# Patient Record
Sex: MALE | Race: WHITE | Employment: FULL TIME | URBAN - METROPOLITAN AREA
[De-identification: names, ages, dates, MRNs, and addresses within clinical notes are randomized per-mention and may not be internally consistent; named-entity substitution may affect disease eponyms.]

---

## 2018-05-24 ENCOUNTER — OFFICE VISIT (OUTPATIENT)
Dept: PRIMARY CARE CLINIC | Age: 43
End: 2018-05-24
Payer: COMMERCIAL

## 2018-05-24 VITALS
OXYGEN SATURATION: 97 % | SYSTOLIC BLOOD PRESSURE: 136 MMHG | WEIGHT: 207 LBS | DIASTOLIC BLOOD PRESSURE: 84 MMHG | HEIGHT: 70 IN | TEMPERATURE: 98.5 F | HEART RATE: 59 BPM | BODY MASS INDEX: 29.63 KG/M2

## 2018-05-24 DIAGNOSIS — L50.8 URTICARIA, CHRONIC: Primary | ICD-10-CM

## 2018-05-24 PROCEDURE — 99213 OFFICE O/P EST LOW 20 MIN: CPT | Performed by: FAMILY MEDICINE

## 2018-05-24 RX ORDER — FEXOFENADINE HCL 180 MG/1
180 TABLET ORAL DAILY
Qty: 30 TABLET | Refills: 2 | Status: SHIPPED | OUTPATIENT
Start: 2018-05-24

## 2018-05-24 ASSESSMENT — PATIENT HEALTH QUESTIONNAIRE - PHQ9
SUM OF ALL RESPONSES TO PHQ QUESTIONS 1-9: 0
SUM OF ALL RESPONSES TO PHQ9 QUESTIONS 1 & 2: 0
1. LITTLE INTEREST OR PLEASURE IN DOING THINGS: 0
2. FEELING DOWN, DEPRESSED OR HOPELESS: 0

## 2020-05-29 ENCOUNTER — OFFICE VISIT (OUTPATIENT)
Dept: FAMILY MEDICINE CLINIC | Age: 45
End: 2020-05-29
Payer: COMMERCIAL

## 2020-05-29 VITALS
HEART RATE: 56 BPM | WEIGHT: 212 LBS | HEIGHT: 70 IN | TEMPERATURE: 97.4 F | SYSTOLIC BLOOD PRESSURE: 132 MMHG | RESPIRATION RATE: 18 BRPM | OXYGEN SATURATION: 97 % | DIASTOLIC BLOOD PRESSURE: 82 MMHG | BODY MASS INDEX: 30.35 KG/M2

## 2020-05-29 PROCEDURE — 99213 OFFICE O/P EST LOW 20 MIN: CPT | Performed by: PHYSICIAN ASSISTANT

## 2020-05-29 RX ORDER — CETIRIZINE HYDROCHLORIDE 10 MG/1
10 TABLET ORAL DAILY
COMMUNITY

## 2020-06-03 ENCOUNTER — OFFICE VISIT (OUTPATIENT)
Dept: PODIATRY | Age: 45
End: 2020-06-03
Payer: COMMERCIAL

## 2020-06-03 VITALS — BODY MASS INDEX: 31.84 KG/M2 | HEIGHT: 69 IN | WEIGHT: 215 LBS

## 2020-06-03 PROCEDURE — 99203 OFFICE O/P NEW LOW 30 MIN: CPT | Performed by: PODIATRIST

## 2020-06-03 PROCEDURE — 64455 NJX AA&/STRD PLTR COM DG NRV: CPT | Performed by: PODIATRIST

## 2020-06-03 RX ORDER — METHYLPREDNISOLONE ACETATE 40 MG/ML
40 INJECTION, SUSPENSION INTRA-ARTICULAR; INTRALESIONAL; INTRAMUSCULAR; SOFT TISSUE ONCE
Status: COMPLETED | OUTPATIENT
Start: 2020-06-03 | End: 2020-06-03

## 2020-06-03 RX ORDER — LIDOCAINE HYDROCHLORIDE 10 MG/ML
1 INJECTION, SOLUTION INFILTRATION; PERINEURAL ONCE
Status: COMPLETED | OUTPATIENT
Start: 2020-06-03 | End: 2020-06-03

## 2020-06-03 RX ORDER — BETAMETHASONE SODIUM PHOSPHATE AND BETAMETHASONE ACETATE 3; 3 MG/ML; MG/ML
6 INJECTION, SUSPENSION INTRA-ARTICULAR; INTRALESIONAL; INTRAMUSCULAR; SOFT TISSUE ONCE
Status: COMPLETED | OUTPATIENT
Start: 2020-06-03 | End: 2020-06-03

## 2020-06-03 RX ADMIN — LIDOCAINE HYDROCHLORIDE 1 ML: 10 INJECTION, SOLUTION INFILTRATION; PERINEURAL at 16:01

## 2020-06-03 RX ADMIN — BETAMETHASONE SODIUM PHOSPHATE AND BETAMETHASONE ACETATE 6 MG: 3; 3 INJECTION, SUSPENSION INTRA-ARTICULAR; INTRALESIONAL; INTRAMUSCULAR; SOFT TISSUE at 16:02

## 2020-06-03 RX ADMIN — METHYLPREDNISOLONE ACETATE 40 MG: 40 INJECTION, SUSPENSION INTRA-ARTICULAR; INTRALESIONAL; INTRAMUSCULAR; SOFT TISSUE at 16:02

## 2020-06-03 NOTE — PROGRESS NOTES
New patient in office referred from [facility] for right foot pain. Patient states he has pain to bilat feet. Sx x several months. Denies any injury. Xrays obtained 20.     20  Sonali Landaverde : 1975 Sex: male  Age: 41 y.o. Patient was referred by Rosa M Medeiros DO    CC:    Pain right foot between second and third metatarsal    HPI:   This pleasant 81-year-old male patient presents today pain right foot between second and third metatarsal.  States pain is been present for several months. Does have tenderness worse when he is walking. Does have some swelling on top of the right foot. Did have radiographs 2020 showing no acute fractures or dislocations. Denies recent formal treatment or therapy. States pain continues to be worse barefoot. Denies any current use of over-the-counter orthotics or inserts. No additional pedal complaints at this time. ROS:  Const: Denies constitutional symptoms  Musculo: Denies symptoms other than stated above  Skin: Denies symptoms other than stated above       Current Outpatient Medications:     cetirizine (ZYRTEC) 10 MG tablet, Take 10 mg by mouth daily, Disp: , Rfl:     Triprolidine-Pseudoephedrine (ANTIHISTAMINE PO), Take by mouth, Disp: , Rfl:     fexofenadine (ALLEGRA ALLERGY) 180 MG tablet, Take 1 tablet by mouth daily (Patient not taking: Reported on 2020), Disp: 30 tablet, Rfl: 2  No Known Allergies    No past medical history on file. Vitals:    20 1534   Weight: 215 lb (97.5 kg)   Height: 5' 9\" (1.753 m)       Work History/Social History:     Focused Lower Extremity Physical Exam:    Neurovascular examination:    Dorsalis Pedis palpable bilateral.  Posterior tibialis palpable bilateral.    Capillary Refill Time:  Immediate return  Hair growth:  Symmetrical and bilateral   Skin:  Not atrophic  Edema: No edema bilateral feet or ankles.   Neurologic:  Light touch intact bilateral.      Musculoskeletal/ Orthopedic

## 2020-07-08 ENCOUNTER — OFFICE VISIT (OUTPATIENT)
Dept: PODIATRY | Age: 45
End: 2020-07-08
Payer: COMMERCIAL

## 2020-07-08 PROCEDURE — 99213 OFFICE O/P EST LOW 20 MIN: CPT | Performed by: PODIATRIST

## 2020-07-08 NOTE — PROGRESS NOTES
Patient in office to follow up with right foot pain. Continues to have pain. Hansa Segundo : 1975 Sex: male  Age: 41 y.o. Patient was referred by Kayli Geller DO    CC:    Follow-up  likely neuroma second interspace right foot. Follow-up painful feet right and left    HPI:   Follow-up  likely neuroma second interspace right foot. Follow-up painful feet right and left  Denies any new injuries. States his arch pain is actually the worst today. Did respond very well with cortisone injection second interspace right foot. No significant pain between toes today. Does state he has arches that are bothering him worse at the end day. Does state he needs new walking shoes. Does wear steel work boots during the day. Still having some tenderness at the end of the day. Denies current inserts or any new shoes. Denies calf pain. No new injuries. ROS:  Const: Denies constitutional symptoms  Musculo: Denies symptoms other than stated above  Skin: Denies symptoms other than stated above       Current Outpatient Medications:     cetirizine (ZYRTEC) 10 MG tablet, Take 10 mg by mouth daily, Disp: , Rfl:     Triprolidine-Pseudoephedrine (ANTIHISTAMINE PO), Take by mouth, Disp: , Rfl:     fexofenadine (ALLEGRA ALLERGY) 180 MG tablet, Take 1 tablet by mouth daily (Patient not taking: Reported on 2020), Disp: 30 tablet, Rfl: 2  No Known Allergies    No past medical history on file. There were no vitals filed for this visit. Work History/Social History:     Focused Lower Extremity Physical Exam:    Neurovascular examination:    Dorsalis Pedis palpable bilateral.  Posterior tibialis palpable bilateral.    Capillary Refill Time:  Immediate return  Hair growth:  Symmetrical and bilateral   Skin:  Not atrophic  Edema: No edema bilateral feet or ankles.   Neurologic:  Light touch intact bilateral.      Musculoskeletal/ Orthopedic examination:    Equinis: Absent bilateral  Dorsiflexion, plantarflexion, inversion, eversion bilateral 5 out of 5 muscle strength  Wiggling toes  Negative Homans  Tenderness second interspace right foot-improving  Pes planovalgus bilateral.  Mild tenderness medial band plantar fascial bilateral      Dermatology examination:    No open skin lesions or abrasions bilateral lower extremity. Assessment and Plan:  Kaye Cole was seen today for follow-up and foot pain. Diagnoses and all orders for this visit:    Right foot pain    Neuroma of second interspace of right foot    Metatarsalgia of right foot    Pes planovalgus    Plantar fasciitis      Follow-up  likely neuroma second interspace right foot. Follow-up painful feet right and left    Did respond well with cortisone injection second space at last visit. I did recommend power step inserts. I also did recommend [employer] running shoes. Avoid barefoot. Did discuss repeat cortisone injection if continued symptoms. I will follow-up 2 months to monitor overall progression. We did check for custom orthotic benefits and he is not covered at this time. We will check benefits again. I did recommend once again power step inserts. Return in about 2 months (around 9/8/2020). Seen By:  Lucia Espinoza DPM      Document was created using voice recognition software. Note was reviewed, however may contain grammatical errors.

## 2020-10-07 ENCOUNTER — OFFICE VISIT (OUTPATIENT)
Dept: PODIATRY | Age: 45
End: 2020-10-07
Payer: COMMERCIAL

## 2020-10-07 VITALS — BODY MASS INDEX: 31.84 KG/M2 | WEIGHT: 215 LBS | HEIGHT: 69 IN | RESPIRATION RATE: 16 BRPM

## 2020-10-07 PROCEDURE — 99213 OFFICE O/P EST LOW 20 MIN: CPT | Performed by: PODIATRIST

## 2020-10-07 NOTE — PROGRESS NOTES
Patient in office to follow up with right foot pain. He states he has pain more so in his toe now. But the other pain he was having has mostly resolved. Keshia Mccloud : 1975 Sex: male  Age: 41 y.o. Patient was referred by Zo Reyes DO    CC:    Follow-up  likely neuroma second interspace right foot. Pain occasionally right great toe    HPI:   Follow-up  likely neuroma second interspace right foot. No pain between right second and third toe today. Progressing very well. Does continue [employer] running shoes. Has been running up to 3 miles a day for the past several weeks. Does have occasional tenderness right great toe joint which comes and goes. States he has had this pain for several years off and on. Denies recent trauma or injury. Pleased with overall progression. No additional pedal complaints. ROS:  Const: Denies constitutional symptoms  Musculo: Denies symptoms other than stated above  Skin: Denies symptoms other than stated above       Current Outpatient Medications:     cetirizine (ZYRTEC) 10 MG tablet, Take 10 mg by mouth daily, Disp: , Rfl:     Triprolidine-Pseudoephedrine (ANTIHISTAMINE PO), Take by mouth, Disp: , Rfl:     fexofenadine (ALLEGRA ALLERGY) 180 MG tablet, Take 1 tablet by mouth daily (Patient not taking: Reported on 2020), Disp: 30 tablet, Rfl: 2  No Known Allergies    No past medical history on file. Vitals:    10/07/20 1536   Resp: 16   Weight: 215 lb (97.5 kg)   Height: 5' 9\" (1.753 m)       Work History/Social History: [employer] runs 3 miles several times a week    Focused Lower Extremity Physical Exam:    Neurovascular examination:    Dorsalis Pedis palpable bilateral.  Posterior tibialis palpable bilateral.    Capillary Refill Time:  Immediate return  Hair growth:  Symmetrical and bilateral   Skin:  Not atrophic  Edema: No edema bilateral feet or ankles.   Neurologic:  Light touch intact bilateral.      Musculoskeletal/ Orthopedic examination:    Equinis: Absent bilateral  Dorsiflexion, plantarflexion, inversion, eversion bilateral 5 out of 5 muscle strength  Wiggling toes  Negative Homans  No pain second or third interspace right foot  Mild crepitus and tenderness range of motion first metatarsal phalangeal joint. Approximately 5 degrees dorsiflexion first metatarsal phalangeal joint right great toe. Dermatology examination:    No open skin lesions or abrasions bilateral lower extremity. Assessment and Plan:  Marco Avery was seen today for foot pain and follow-up. Diagnoses and all orders for this visit:    Right foot pain    Neuroma of second interspace of right foot    Hallux limitus, right      Follow-up likely neuroma second interspace right foot. Pain-free today. Progressing very well. Continue [employer] running shoes. Does present today clinically has hallux limitus right great toe. Did discuss cortisone injection first metatarsophalangeal joint. Patient like to hold off at this time. Follow-up as needed. Does continue running several miles a day. Continue [employer] running shoes. Any increased pain any new tenderness bilateral foot come in sooner. Return if symptoms worsen or fail to improve. Seen By:  Giulia Bazan DPM      Document was created using voice recognition software. Note was reviewed, however may contain grammatical errors.

## 2020-12-21 ENCOUNTER — OFFICE VISIT (OUTPATIENT)
Dept: PRIMARY CARE CLINIC | Age: 45
End: 2020-12-21
Payer: COMMERCIAL

## 2020-12-21 VITALS
WEIGHT: 215 LBS | HEIGHT: 69 IN | DIASTOLIC BLOOD PRESSURE: 76 MMHG | BODY MASS INDEX: 31.84 KG/M2 | TEMPERATURE: 98.4 F | RESPIRATION RATE: 20 BRPM | OXYGEN SATURATION: 99 % | HEART RATE: 65 BPM | SYSTOLIC BLOOD PRESSURE: 120 MMHG

## 2020-12-21 DIAGNOSIS — Z20.822 ENCOUNTER FOR LABORATORY TESTING FOR COVID-19 VIRUS: ICD-10-CM

## 2020-12-21 PROCEDURE — 99213 OFFICE O/P EST LOW 20 MIN: CPT | Performed by: NURSE PRACTITIONER

## 2020-12-21 RX ORDER — FLUTICASONE PROPIONATE 50 MCG
1 SPRAY, SUSPENSION (ML) NASAL DAILY
Qty: 1 BOTTLE | Refills: 0 | Status: SHIPPED | OUTPATIENT
Start: 2020-12-21

## 2020-12-21 RX ORDER — AMOXICILLIN 875 MG/1
875 TABLET, COATED ORAL 2 TIMES DAILY
Qty: 20 TABLET | Refills: 0 | Status: SHIPPED | OUTPATIENT
Start: 2020-12-21 | End: 2020-12-31

## 2020-12-21 NOTE — LETTER
[address]  [address]  Dept: [phone]  Dept Fax: [phone]    CHRISTOPHER Tompkins CNP      12/21/2020     Patient: Gloria Rinaldi   YOB: 1975       To Whom It May Concern: It is my medical opinion that Gloria Rinaldi should remain out of work while acutely ill and awaiting COVID-19 test results. Return to work with no retesting should be followed if test is negative AND meets these 3 criteria as outlined by CDC/[facility]H:   a. No fever without the use of fever reducers for 24 hours  b. Improvement in symptoms  c. At least 7 days since the onset of symptoms     If tests positive for COVID-19, needs minimum of 10 days strict quarantine, improvement of symptoms and 24 hours fever free without fever reducing medications. If you have any questions or concerns, please don't hesitate to call.     Sincerely,        CHRISTOPHER Tompkins CNP

## 2020-12-21 NOTE — PROGRESS NOTES
Chief Complaint   Nasal Congestion (sx x 7 days), Drainage, Pharyngitis, Cough, Fatigue, and Covid Testing      History of Present Illness   Source of history provided by:  patient. Violetta Burnett is a 41 y.o. old male who presents to the flu clinic with complaints of Generalized Body Aches, Rhinorrhea, Nasal Congestion, Post nasal drip, dry Cough, Chest congestion and Patient was exposed to someone who is a known Covid-19 infected person or directly caring for such person x 7 days. States symptoms have stayed the same since onset. Has been taking Advil cold and sinus for the symptoms with some relief. Denies any Fever, Shortness of breath, Nausea, Vomiting, Chest Pain, Abdominal Pain, Rash, Lethargy or Close contact with a lab confirmed COVID-19 patient within 14 days of symptom onset . Denies any hx of asthma, COPD or emphysema. Denies tobacco use. ROS   Pertinent positives and negatives are stated within HPI, all other systems reviewed and are negative. Past Medical History:  has no past medical history on file. Past Surgical History:  has no past surgical history on file. Social History:  reports that he has never smoked. He has never used smokeless tobacco.  Family History: family history is not on file. Allergies: Patient has no known allergies. Physical Exam   Vital Signs:  Pulse 65   Temp 98.4 °F (36.9 °C) (Oral)   Resp 20   Ht 5' 9\" (1.753 m)   Wt 215 lb (97.5 kg)   SpO2 99%   BMI 31.75 kg/m²    Oxygen Saturation Interpretation: Normal.    Constitutional:  Alert, development consistent with age. NAD. Head:  NC/NT. Airway patent. Ears: TMs clear bilaterally. Canals without exudate or swelling bilaterally. Mouth: Posterior pharynx with mild erythema and clear postnasal drip. No tonsillar hypertrophy or exudate. Neck:  Normal ROM. Supple. No anterior cervical adenopathy noted. Lungs: CTAB without wheezes, rales, or rhonchi.    CV:  Regular rate and rhythm, normal heart sounds, without pathological murmurs, ectopy, gallops, or rubs. Skin:  Normal turgor. Warm, dry, without visible rash. Lymphatic: No lymphangitis or adenopathy noted. Neurological:  Oriented. Motor functions intact. Lab / Imaging Results   (All laboratory and radiology results have been personally reviewed by myself)  Labs:  No results found for this visit on 12/21/20. Imaging: All Radiology results interpreted by Radiologist unless otherwise noted. No results found. Medical Decision Making   Pt non-toxic, in no apparent distress and stable at time of discharge. Assessment/Plan   Ailyn Meyer was seen today for nasal congestion, drainage, pharyngitis, cough, fatigue and covid testing. Diagnoses and all orders for this visit:    Upper respiratory infection with cough and congestion  -     amoxicillin (AMOXIL) 875 MG tablet; Take 1 tablet by mouth 2 times daily for 10 days  -     fluticasone (FLONASE) 50 MCG/ACT nasal spray; 1 spray by Each Nostril route daily    Encounter for laboratory testing for COVID-19 virus  -     COVID-19 Ambulatory; Future      COVID-19 swab obtained and pending, will call with results once available. Advised self-quarantine at home in the interim. Prescription sent to pharmacy as above, side effects discussed. Increase fluids and rest. Symptomatic relief discussed including Tylenol prn pain/fever. Schedule f/u with PCP in 7-10 days if symptoms persist. ED sooner if symptoms worsen or change. ED immediately with high or refractory fever, progressive SOB, dyspnea, CP, calf pain/swelling, shaking chills, vomiting, abdominal pain, lethargy, flank pain, or decreased urinary output. Pt verbalizes understanding and is in agreement with plan of care. All questions answered. Christelle & Reza, APRN - CNP    This visit was provided as a focused evaluation during the COVID -19 pandemic/national emergency. A comprehensive review of all previous patient history and testing was not conducted. Pertinent findings were elicited during the visit. *NOTE: This report was transcribed using voice recognition software. Every effort was made to ensure accuracy; however, inadvertent computerized transcription errors may be present.

## 2020-12-23 LAB
SARS-COV-2: DETECTED
SOURCE: ABNORMAL

## 2020-12-28 ENCOUNTER — TELEPHONE (OUTPATIENT)
Dept: PRIMARY CARE CLINIC | Age: 45
End: 2020-12-28

## 2022-11-30 ENCOUNTER — OFFICE VISIT (OUTPATIENT)
Dept: FAMILY MEDICINE CLINIC | Age: 47
End: 2022-11-30
Payer: COMMERCIAL

## 2022-11-30 VITALS
HEART RATE: 82 BPM | TEMPERATURE: 98.2 F | OXYGEN SATURATION: 98 % | DIASTOLIC BLOOD PRESSURE: 76 MMHG | BODY MASS INDEX: 32.93 KG/M2 | SYSTOLIC BLOOD PRESSURE: 128 MMHG | WEIGHT: 223 LBS

## 2022-11-30 DIAGNOSIS — B96.89 ACUTE BACTERIAL SINUSITIS: Primary | ICD-10-CM

## 2022-11-30 DIAGNOSIS — R05.1 ACUTE COUGH: ICD-10-CM

## 2022-11-30 DIAGNOSIS — J01.90 ACUTE BACTERIAL SINUSITIS: Primary | ICD-10-CM

## 2022-11-30 PROCEDURE — 99213 OFFICE O/P EST LOW 20 MIN: CPT | Performed by: FAMILY MEDICINE

## 2022-11-30 RX ORDER — GUAIFENESIN AND CODEINE PHOSPHATE 100; 10 MG/5ML; MG/5ML
5 SOLUTION ORAL EVERY 4 HOURS PRN
Qty: 237 ML | Refills: 0 | Status: SHIPPED | OUTPATIENT
Start: 2022-11-30 | End: 2022-12-08

## 2022-11-30 RX ORDER — CEFDINIR 300 MG/1
300 CAPSULE ORAL 2 TIMES DAILY
Qty: 14 CAPSULE | Refills: 0 | Status: SHIPPED | OUTPATIENT
Start: 2022-11-30 | End: 2022-12-07

## 2022-11-30 RX ORDER — METHYLPREDNISOLONE 4 MG/1
TABLET ORAL
Qty: 1 KIT | Refills: 0 | Status: SHIPPED | OUTPATIENT
Start: 2022-11-30

## 2022-11-30 ASSESSMENT — ENCOUNTER SYMPTOMS
COUGH: 1
SINUS PRESSURE: 1
SINUS PAIN: 1

## 2022-11-30 NOTE — PROGRESS NOTES
Romeo Masterson (:  1975) is a 56 y.o. male,Established patient, here for evaluation of the following chief complaint(s):  Cough and Chest Congestion Carlos Max         ASSESSMENT/PLAN:  1. Acute bacterial sinusitis  -     cefdinir (OMNICEF) 300 MG capsule; Take 1 capsule by mouth 2 times daily for 7 days, Disp-14 capsule, R-0Normal  -     methylPREDNISolone (MEDROL DOSEPACK) 4 MG tablet; Take by mouth., Disp-1 kit, R-0Normal  -     guaiFENesin-codeine (CHERATUSSIN AC) 100-10 MG/5ML syrup; Take 5 mLs by mouth every 4 hours as needed for Cough for up to 8 days. , Disp-237 mL, R-0Normal  2. Acute cough  -     cefdinir (OMNICEF) 300 MG capsule; Take 1 capsule by mouth 2 times daily for 7 days, Disp-14 capsule, R-0Normal  -     methylPREDNISolone (MEDROL DOSEPACK) 4 MG tablet; Take by mouth., Disp-1 kit, R-0Normal  -     guaiFENesin-codeine (CHERATUSSIN AC) 100-10 MG/5ML syrup; Take 5 mLs by mouth every 4 hours as needed for Cough for up to 8 days. , Disp-237 mL, R-0Normal  At this time we will treat symptomatically. Red flags discussed with patient if these occur he is to go directly to the nearest emergency department. Patient voiced understanding. No follow-ups on file. Subjective   SUBJECTIVE/OBJECTIVE:  HPI  Patient presents today for 2 and half weeks worth of worsening chest congestion, cough, sinus pain and pressure. Denies any fever or chills. Denies any loss of taste or smell. Denies any recent sick contact or travel. Review of Systems   HENT:  Positive for congestion, sinus pressure and sinus pain. Respiratory:  Positive for cough. All other systems reviewed and are negative.        Current Outpatient Medications:     dextromethorphan-guaiFENesin (MUCINEX DM)  MG per extended release tablet, Take 1 tablet by mouth every 12 hours as needed, Disp: , Rfl:     cefdinir (OMNICEF) 300 MG capsule, Take 1 capsule by mouth 2 times daily for 7 days, Disp: 14 capsule, Rfl: 0   methylPREDNISolone (MEDROL DOSEPACK) 4 MG tablet, Take by mouth., Disp: 1 kit, Rfl: 0    guaiFENesin-codeine (CHERATUSSIN AC) 100-10 MG/5ML syrup, Take 5 mLs by mouth every 4 hours as needed for Cough for up to 8 days. , Disp: 237 mL, Rfl: 0    cetirizine (ZYRTEC) 10 MG tablet, Take 10 mg by mouth daily, Disp: , Rfl:    There is no problem list on file for this patient. No past medical history on file. No past surgical history on file. Social History     Socioeconomic History    Marital status:      Spouse name: Not on file    Number of children: Not on file    Years of education: Not on file    Highest education level: Not on file   Occupational History    Not on file   Tobacco Use    Smoking status: Never    Smokeless tobacco: Never   Substance and Sexual Activity    Alcohol use: Not on file    Drug use: Not on file    Sexual activity: Not on file   Other Topics Concern    Not on file   Social History Narrative    Not on file     Social Determinants of Health     Financial Resource Strain: Not on file   Food Insecurity: Not on file   Transportation Needs: Not on file   Physical Activity: Not on file   Stress: Not on file   Social Connections: Not on file   Intimate Partner Violence: Not on file   Housing Stability: Not on file     No family history on file. There are no preventive care reminders to display for this patient. There are no preventive care reminders to display for this patient.    Diabetes Management   Topic Date Due    Lipids  Never done      Health Maintenance Due   Topic    DTaP/Tdap/Td vaccine (2 - Td or Tdap)      Health Maintenance   Topic Date Due    Depression Screen  Never done    HIV screen  Never done    Hepatitis C screen  Never done    Lipids  Never done    Colorectal Cancer Screen  Never done    COVID-19 Vaccine (3 - Booster) 06/02/2021    DTaP/Tdap/Td vaccine (2 - Td or Tdap) 09/08/2022    Flu vaccine  Completed    Hepatitis A vaccine  Aged Ramana Hernandez Hib vaccine  Aged Out    Meningococcal (ACWY) vaccine  Aged Out    Pneumococcal 0-64 years Vaccine  Aged Out      There are no preventive care reminders to display for this patient. There are no preventive care reminders to display for this patient. /76   Pulse 82   Temp 98.2 °F (36.8 °C)   Wt 223 lb (101.2 kg)   SpO2 98%   BMI 32.93 kg/m²     Objective   Physical Exam  Vitals reviewed. Constitutional:       Appearance: He is well-developed. He is ill-appearing. HENT:      Head: Normocephalic and atraumatic. Right Ear: External ear normal. A middle ear effusion is present. Tympanic membrane is bulging. Left Ear: External ear normal. A middle ear effusion is present. Tympanic membrane is bulging. Nose: Nose normal.      Mouth/Throat:      Pharynx: Posterior oropharyngeal erythema present. Eyes:      Conjunctiva/sclera: Conjunctivae normal.      Pupils: Pupils are equal, round, and reactive to light. Cardiovascular:      Rate and Rhythm: Normal rate and regular rhythm. Heart sounds: Normal heart sounds. Pulmonary:      Effort: Pulmonary effort is normal.      Breath sounds: Decreased breath sounds and wheezing present. Abdominal:      General: Bowel sounds are normal.      Palpations: Abdomen is soft. Musculoskeletal:         General: Normal range of motion. Cervical back: Normal range of motion and neck supple. Skin:     General: Skin is warm and dry. Findings: No erythema. Neurological:      Mental Status: He is alert and oriented to person, place, and time. Cranial Nerves: No cranial nerve deficit. Psychiatric:         Behavior: Behavior normal.         Judgment: Judgment normal.                An electronic signature was used to authenticate this note.     --Sonali Cox DO

## 2024-10-07 ENCOUNTER — TELEPHONE (OUTPATIENT)
Dept: PRIMARY CARE CLINIC | Age: 49
End: 2024-10-07

## 2024-10-07 NOTE — TELEPHONE ENCOUNTER
L/m on voicemail to have pt call back to get him scheduled. He would be re-establishing as a new patient. Ok'd by

## 2024-10-07 NOTE — TELEPHONE ENCOUNTER
----- Message from Kirsten ANN sent at 10/7/2024  8:58 AM EDT -----  Regarding: ECC Escalation To Practice  ECC Escalation To Practice      Type of Escalation: Acute Care Symptom  --------------------------------------------------------------------------------------------------------------------------    Information for Provider:  Patient is looking for appointment for: Symptom Knee pain and eye infection   Reasons for Message: Patient disconnected     Additional Information: Patient is experiencing knee pain  and eye infection.   --------------------------------------------------------------------------------------------------------------------------    Relationship to Patient: Self     Call Back Info: OK to leave message on voicemail  Preferred Call Back Number: Phone [phone]

## 2024-10-15 ENCOUNTER — OFFICE VISIT (OUTPATIENT)
Dept: PRIMARY CARE CLINIC | Age: 49
End: 2024-10-15
Payer: COMMERCIAL

## 2024-10-15 VITALS
WEIGHT: 230 LBS | BODY MASS INDEX: 33.97 KG/M2 | OXYGEN SATURATION: 96 % | SYSTOLIC BLOOD PRESSURE: 128 MMHG | HEART RATE: 79 BPM | DIASTOLIC BLOOD PRESSURE: 80 MMHG | TEMPERATURE: 97.7 F

## 2024-10-15 DIAGNOSIS — H10.13 ALLERGIC CONJUNCTIVITIS OF BOTH EYES: ICD-10-CM

## 2024-10-15 DIAGNOSIS — L50.3 DERMATOGRAPHIA: ICD-10-CM

## 2024-10-15 DIAGNOSIS — Z00.00 ANNUAL PHYSICAL EXAM: Primary | ICD-10-CM

## 2024-10-15 DIAGNOSIS — M23.91 INTERNAL DERANGEMENT OF RIGHT KNEE: ICD-10-CM

## 2024-10-15 PROCEDURE — 99396 PREV VISIT EST AGE 40-64: CPT | Performed by: FAMILY MEDICINE

## 2024-10-15 RX ORDER — AZELASTINE HYDROCHLORIDE 0.5 MG/ML
1 SOLUTION/ DROPS OPHTHALMIC 2 TIMES DAILY
Qty: 6 ML | Refills: 0 | Status: SHIPPED | OUTPATIENT
Start: 2024-10-15 | End: 2024-11-14

## 2024-10-15 RX ORDER — CETIRIZINE HYDROCHLORIDE 10 MG/1
10 TABLET ORAL DAILY
COMMUNITY

## 2024-10-15 SDOH — ECONOMIC STABILITY: FOOD INSECURITY: WITHIN THE PAST 12 MONTHS, YOU WORRIED THAT YOUR FOOD WOULD RUN OUT BEFORE YOU GOT MONEY TO BUY MORE.: NEVER TRUE

## 2024-10-15 SDOH — ECONOMIC STABILITY: FOOD INSECURITY: WITHIN THE PAST 12 MONTHS, THE FOOD YOU BOUGHT JUST DIDN'T LAST AND YOU DIDN'T HAVE MONEY TO GET MORE.: NEVER TRUE

## 2024-10-15 SDOH — ECONOMIC STABILITY: INCOME INSECURITY: HOW HARD IS IT FOR YOU TO PAY FOR THE VERY BASICS LIKE FOOD, HOUSING, MEDICAL CARE, AND HEATING?: NOT HARD AT ALL

## 2024-10-15 ASSESSMENT — PATIENT HEALTH QUESTIONNAIRE - PHQ9
2. FEELING DOWN, DEPRESSED OR HOPELESS: NOT AT ALL
SUM OF ALL RESPONSES TO PHQ QUESTIONS 1-9: 0
SUM OF ALL RESPONSES TO PHQ QUESTIONS 1-9: 0
1. LITTLE INTEREST OR PLEASURE IN DOING THINGS: NOT AT ALL
SUM OF ALL RESPONSES TO PHQ9 QUESTIONS 1 & 2: 0
SUM OF ALL RESPONSES TO PHQ QUESTIONS 1-9: 0
SUM OF ALL RESPONSES TO PHQ QUESTIONS 1-9: 0

## 2024-10-15 NOTE — PROGRESS NOTES
or tenderness noted  Eyes: Conjunctivae appear normal. PERRL  Ears: External ears normal, TM's clear and intact  Nose/Sinuses: Nares normal. Septum midline. Mucosa normal. No drainage  Oropharynx: Oropharynx clear with no exudate seen  Neck: Neck supple. No jugular venous distension, lymphadenopathy or thyromegaly Trachea midline. No carotid bruits  Lungs: Lungs clear to auscultation bilaterally.  No rhonchi, crackles or wheezes  Heart: S1 S2  Regular rate and rhythm. No rub, murmur or gallop  Abdomen: Abdomen soft, non-tender. BS normal. No masses, organomegaly  Extremities: No edema, Peripheral pulses palpable  Musculoskeletal: Muscular strength appears intact. No joint effusion, tenderness, swelling or warmth  Neuro:  No focal motor or sensory deficits. 2+/4 L4 reflexes  Wt: up 15 lbs.      ASSESSMENT/PLAN:  Brett was seen today for annual exam and eye drainage.    Diagnoses and all orders for this visit:    Annual physical exam  -     Lipid Panel; Future  -     CBC; Future  -     Comprehensive Metabolic Panel; Future  -     Lipoprotein A (LPA); Future    Internal derangement of right knee  -     MRI KNEE RIGHT WO CONTRAST; Future    Dermatographia    Allergic conjunctivitis of both eyes  -     azelastine (OPTIVAR) 0.05 % ophthalmic solution; Place 1 drop into both eyes 2 times daily     - continue Zyrtec or try Allegra 180 mg daily for skin  - he's to get us copy of colonoscopy report.    Return for based on lab results, or for acute problem.    Nilesh Blunt,     Neuro:  No focal motor or sensory deficits. 2+/4 L4 reflexes        ASSESSMENT/PLAN:  Brett was seen today for annual exam and eye drainage.    Diagnoses and all orders for this visit:    Annual physical exam  -     Lipid Panel; Future  -     CBC; Future  -     Comprehensive Metabolic Panel; Future  -     Lipoprotein A (LPA); Future    Internal derangement of right knee  -     MRI KNEE RIGHT WO CONTRAST;

## 2024-10-29 DIAGNOSIS — Z00.00 ANNUAL PHYSICAL EXAM: ICD-10-CM

## 2024-10-29 LAB
ALBUMIN: 4.8 G/DL (ref 3.5–5.2)
ALP BLD-CCNC: 66 U/L (ref 40–129)
ALT SERPL-CCNC: 17 U/L (ref 0–40)
ANION GAP SERPL CALCULATED.3IONS-SCNC: 9 MMOL/L (ref 7–16)
AST SERPL-CCNC: 21 U/L (ref 0–39)
BILIRUB SERPL-MCNC: 0.5 MG/DL (ref 0–1.2)
BUN BLDV-MCNC: 18 MG/DL (ref 6–20)
CALCIUM SERPL-MCNC: 9.4 MG/DL (ref 8.6–10.2)
CHLORIDE BLD-SCNC: 102 MMOL/L (ref 98–107)
CHOLESTEROL, TOTAL: 160 MG/DL
CO2: 30 MMOL/L (ref 22–29)
CREAT SERPL-MCNC: 0.9 MG/DL (ref 0.7–1.2)
GFR, ESTIMATED: >90 ML/MIN/1.73M2
GLUCOSE BLD-MCNC: 72 MG/DL (ref 74–99)
HCT VFR BLD CALC: 46.2 % (ref 37–54)
HDLC SERPL-MCNC: 31 MG/DL
HEMOGLOBIN: 15.3 G/DL (ref 12.5–16.5)
LDL CHOLESTEROL: 108 MG/DL
MCH RBC QN AUTO: 29.8 PG (ref 26–35)
MCHC RBC AUTO-ENTMCNC: 33.1 G/DL (ref 32–34.5)
MCV RBC AUTO: 89.9 FL (ref 80–99.9)
PDW BLD-RTO: 12.7 % (ref 11.5–15)
PLATELET # BLD: 249 K/UL (ref 130–450)
PMV BLD AUTO: 10.6 FL (ref 7–12)
POTASSIUM SERPL-SCNC: 4.4 MMOL/L (ref 3.5–5)
RBC # BLD: 5.14 M/UL (ref 3.8–5.8)
SODIUM BLD-SCNC: 141 MMOL/L (ref 132–146)
TOTAL PROTEIN: 7.6 G/DL (ref 6.4–8.3)
TRIGL SERPL-MCNC: 106 MG/DL
VLDLC SERPL CALC-MCNC: 21 MG/DL
WBC # BLD: 6.6 K/UL (ref 4.5–11.5)

## 2024-11-01 LAB — LIPOPROTEIN (A): <6 MG/DL

## 2024-11-01 NOTE — RESULT ENCOUNTER NOTE
Please notify patient that their lab results are overall normal.  Bad cholesterol or LDL just barely over 100.  Follow low-cholesterol diet.

## 2024-12-23 ENCOUNTER — HOSPITAL ENCOUNTER (OUTPATIENT)
Dept: MRI IMAGING | Age: 49
Discharge: HOME | End: 2024-12-25
Attending: FAMILY MEDICINE
Payer: COMMERCIAL

## 2024-12-23 DIAGNOSIS — M23.91 INTERNAL DERANGEMENT OF RIGHT KNEE: ICD-10-CM

## 2024-12-23 PROCEDURE — 73721 MRI JNT OF LWR EXTRE W/O DYE: CPT

## 2024-12-30 ENCOUNTER — TELEPHONE (OUTPATIENT)
Dept: PRIMARY CARE CLINIC | Age: 49
End: 2024-12-30

## 2024-12-30 NOTE — TELEPHONE ENCOUNTER
Left message MRI shows no significant tear of any ligaments or menisci of his knee.  There is some moderate infrapatellar bursitis seen.  We can do a injection into this area if he wishes.  In the meantime he can take anti-inflammatories and use heat or ice to this area to see if that helps.

## 2025-04-07 ENCOUNTER — OFFICE VISIT (OUTPATIENT)
Dept: FAMILY MEDICINE CLINIC | Age: 50
End: 2025-04-07
Payer: COMMERCIAL

## 2025-04-07 VITALS
RESPIRATION RATE: 17 BRPM | BODY MASS INDEX: 33.18 KG/M2 | WEIGHT: 224 LBS | HEIGHT: 69 IN | SYSTOLIC BLOOD PRESSURE: 130 MMHG | HEART RATE: 78 BPM | TEMPERATURE: 97.8 F | OXYGEN SATURATION: 98 % | DIASTOLIC BLOOD PRESSURE: 84 MMHG

## 2025-04-07 DIAGNOSIS — J20.9 ACUTE BRONCHITIS, UNSPECIFIED ORGANISM: Primary | ICD-10-CM

## 2025-04-07 PROCEDURE — 99213 OFFICE O/P EST LOW 20 MIN: CPT | Performed by: FAMILY MEDICINE

## 2025-04-07 RX ORDER — AZITHROMYCIN 250 MG/1
TABLET, FILM COATED ORAL
Qty: 6 TABLET | Refills: 0 | Status: SHIPPED | OUTPATIENT
Start: 2025-04-07 | End: 2025-04-17

## 2025-04-07 RX ORDER — METHYLPREDNISOLONE 4 MG/1
TABLET ORAL
Qty: 1 KIT | Refills: 0 | Status: SHIPPED | OUTPATIENT
Start: 2025-04-07 | End: 2025-04-13

## 2025-04-07 NOTE — PROGRESS NOTES
[facility]    Brett Jiang presents to the office today for   Chief Complaint   Patient presents with    Cough     X 1 week     Sinusitis       History of Present Illness  The patient presents for evaluation of a persistent cough.    The chief complaint is a cough that began a little over a week ago and has not subsided. He suspects postnasal drainage as a contributing factor, as significant amounts of mucus can be expelled from his nasal passages. No fever has been noted, although chapped lips are reported. Gastrointestinal symptoms such as vomiting or diarrhea are denied. There is no known exposure to specific allergens or irritants. He has been taking Zyrtec daily, which has been effective. Reduced work hours have led to decreased stress levels. He does not require a work note at this time. Cough medication provides relief for approximately 5.5 hours before symptoms return.        Review of Systems     /84   Pulse 78   Temp 97.8 °F (36.6 °C) (Temporal)   Resp 17   Ht 1.753 m (5' 9\")   Wt 101.6 kg (224 lb)   SpO2 98%   BMI 33.08 kg/m²   Physical Exam  Constitutional:       Appearance: Normal appearance.   HENT:      Head: Normocephalic and atraumatic.      Nose: Rhinorrhea present.      Mouth/Throat:      Mouth: Mucous membranes are moist.      Pharynx: Posterior oropharyngeal erythema present.   Eyes:      Extraocular Movements: Extraocular movements intact.      Conjunctiva/sclera: Conjunctivae normal.   Cardiovascular:      Rate and Rhythm: Normal rate.      Heart sounds: Normal heart sounds.   Pulmonary:      Effort: Pulmonary effort is normal.      Breath sounds: Normal breath sounds.   Skin:     General: Skin is warm.   Neurological:      Mental Status: He is alert and oriented to person, place, and time.   Psychiatric:         Mood and Affect: Mood normal.         Behavior: Behavior normal.            Current Outpatient Medications:     azithromycin (ZITHROMAX) 250 MG tablet, 500mg

## 2025-05-16 ENCOUNTER — HOSPITAL ENCOUNTER (EMERGENCY)
Age: 50
Discharge: HOME | End: 2025-05-16
Payer: COMMERCIAL

## 2025-05-16 ENCOUNTER — OFFICE VISIT (OUTPATIENT)
Dept: FAMILY MEDICINE CLINIC | Age: 50
End: 2025-05-16

## 2025-05-16 ENCOUNTER — APPOINTMENT (OUTPATIENT)
Dept: CT IMAGING | Age: 50
End: 2025-05-16
Payer: COMMERCIAL

## 2025-05-16 VITALS
HEART RATE: 94 BPM | OXYGEN SATURATION: 95 % | DIASTOLIC BLOOD PRESSURE: 74 MMHG | SYSTOLIC BLOOD PRESSURE: 122 MMHG | BODY MASS INDEX: 32.67 KG/M2 | HEIGHT: 69 IN | WEIGHT: 220.6 LBS | TEMPERATURE: 99.9 F

## 2025-05-16 VITALS
RESPIRATION RATE: 18 BRPM | OXYGEN SATURATION: 99 % | SYSTOLIC BLOOD PRESSURE: 132 MMHG | HEIGHT: 69 IN | BODY MASS INDEX: 32.58 KG/M2 | DIASTOLIC BLOOD PRESSURE: 87 MMHG | TEMPERATURE: 100 F | WEIGHT: 220 LBS | HEART RATE: 94 BPM

## 2025-05-16 DIAGNOSIS — R50.9 FEVER, UNSPECIFIED FEVER CAUSE: Primary | ICD-10-CM

## 2025-05-16 DIAGNOSIS — K76.9 LIVER LESION: ICD-10-CM

## 2025-05-16 DIAGNOSIS — K59.00 CONSTIPATION, UNSPECIFIED CONSTIPATION TYPE: Primary | ICD-10-CM

## 2025-05-16 DIAGNOSIS — K57.90 DIVERTICULOSIS: ICD-10-CM

## 2025-05-16 DIAGNOSIS — R10.30 LOWER ABDOMINAL PAIN: ICD-10-CM

## 2025-05-16 DIAGNOSIS — D72.819 LEUKOPENIA, UNSPECIFIED TYPE: ICD-10-CM

## 2025-05-16 LAB
ALBUMIN SERPL-MCNC: 4.3 G/DL (ref 3.5–5.2)
ALP SERPL-CCNC: 57 U/L (ref 40–129)
ALT SERPL-CCNC: 37 U/L (ref 0–40)
ANION GAP SERPL CALCULATED.3IONS-SCNC: 14 MMOL/L (ref 7–16)
AST SERPL-CCNC: 46 U/L (ref 0–39)
BACTERIA URNS QL MICRO: ABNORMAL
BASOPHILS # BLD: 0 K/UL (ref 0–0.2)
BASOPHILS NFR BLD: 0 % (ref 0–2)
BILIRUB SERPL-MCNC: 0.8 MG/DL (ref 0–1.2)
BILIRUB UR QL STRIP: ABNORMAL
BILIRUBIN, POC: NORMAL
BLOOD URINE, POC: NORMAL
BUN SERPL-MCNC: 24 MG/DL (ref 6–20)
CALCIUM SERPL-MCNC: 8.8 MG/DL (ref 8.6–10.2)
CHLORIDE SERPL-SCNC: 102 MMOL/L (ref 98–107)
CK SERPL-CCNC: 105 U/L (ref 20–200)
CLARITY UR: CLEAR
CLARITY, POC: NEGATIVE
CO2 SERPL-SCNC: 21 MMOL/L (ref 22–29)
COLOR UR: YELLOW
COLOR, POC: NORMAL
CREAT SERPL-MCNC: 1.1 MG/DL (ref 0.7–1.2)
EOSINOPHIL # BLD: 0 K/UL (ref 0.05–0.5)
EOSINOPHILS RELATIVE PERCENT: 0 % (ref 0–6)
ERYTHROCYTE [DISTWIDTH] IN BLOOD BY AUTOMATED COUNT: 12.9 % (ref 11.5–15)
GFR, ESTIMATED: 80 ML/MIN/1.73M2
GLUCOSE SERPL-MCNC: 102 MG/DL (ref 74–99)
GLUCOSE UR STRIP-MCNC: NEGATIVE MG/DL
GLUCOSE URINE, POC: NEGATIVE MG/DL
HCT VFR BLD AUTO: 43.4 % (ref 37–54)
HGB BLD-MCNC: 15.3 G/DL (ref 12.5–16.5)
HGB UR QL STRIP.AUTO: NEGATIVE
INFLUENZA A ANTIBODY: NEGATIVE
INFLUENZA B ANTIBODY: NEGATIVE
KETONES UR STRIP-MCNC: >80 MG/DL
KETONES, POC: NORMAL MG/DL
LACTATE BLDV-SCNC: 1.1 MMOL/L (ref 0.5–2.2)
LEUKOCYTE EST, POC: NEGATIVE
LEUKOCYTE ESTERASE UR QL STRIP: NEGATIVE
LIPASE SERPL-CCNC: 45 U/L (ref 13–60)
LYMPHOCYTES NFR BLD: 0.27 K/UL (ref 1.5–4)
LYMPHOCYTES RELATIVE PERCENT: 12 % (ref 20–42)
Lab: NORMAL
MCH RBC QN AUTO: 30.3 PG (ref 26–35)
MCHC RBC AUTO-ENTMCNC: 35.3 G/DL (ref 32–34.5)
MCV RBC AUTO: 85.9 FL (ref 80–99.9)
MONOCYTES NFR BLD: 0.12 K/UL (ref 0.1–0.95)
MONOCYTES NFR BLD: 5 % (ref 2–12)
MUCOUS THREADS URNS QL MICRO: PRESENT
NEUTROPHILS NFR BLD: 83 % (ref 43–80)
NEUTS SEG NFR BLD: 1.91 K/UL (ref 1.8–7.3)
NITRITE UR QL STRIP: NEGATIVE
NITRITE, POC: NEGATIVE
PERFORMING INSTRUMENT: NORMAL
PH UR STRIP: 6 [PH] (ref 5–8)
PH, POC: 5.5
PLATELET CONFIRMATION: NORMAL
PLATELET, FLUORESCENCE: 80 K/UL (ref 130–450)
PMV BLD AUTO: 10.6 FL (ref 7–12)
POTASSIUM SERPL-SCNC: 4 MMOL/L (ref 3.5–5)
PROT SERPL-MCNC: 7.1 G/DL (ref 6.4–8.3)
PROT UR STRIP-MCNC: 100 MG/DL
PROTEIN, POC: NORMAL MG/DL
QC PASS/FAIL: NORMAL
RBC # BLD AUTO: 5.05 M/UL (ref 3.8–5.8)
RBC # BLD: NORMAL 10*6/UL
RBC #/AREA URNS HPF: ABNORMAL /HPF
SARS-COV-2, POC: NORMAL
SODIUM SERPL-SCNC: 137 MMOL/L (ref 132–146)
SP GR UR STRIP: 1.02 (ref 1–1.03)
SPECIFIC GRAVITY, POC: >=1.03
UROBILINOGEN UR STRIP-ACNC: 1 EU/DL (ref 0–1)
UROBILINOGEN, POC: 1 MG/DL
WBC #/AREA URNS HPF: ABNORMAL /HPF
WBC OTHER # BLD: 2.3 K/UL (ref 4.5–11.5)

## 2025-05-16 PROCEDURE — 6360000004 HC RX CONTRAST MEDICATION: Performed by: RADIOLOGY

## 2025-05-16 PROCEDURE — 81001 URINALYSIS AUTO W/SCOPE: CPT

## 2025-05-16 PROCEDURE — 2580000003 HC RX 258: Performed by: PHYSICIAN ASSISTANT

## 2025-05-16 PROCEDURE — 99285 EMERGENCY DEPT VISIT HI MDM: CPT

## 2025-05-16 PROCEDURE — 6370000000 HC RX 637 (ALT 250 FOR IP): Performed by: PHYSICIAN ASSISTANT

## 2025-05-16 PROCEDURE — 83605 ASSAY OF LACTIC ACID: CPT

## 2025-05-16 PROCEDURE — 6360000002 HC RX W HCPCS: Performed by: PHYSICIAN ASSISTANT

## 2025-05-16 PROCEDURE — 85025 COMPLETE CBC W/AUTO DIFF WBC: CPT

## 2025-05-16 PROCEDURE — 80053 COMPREHEN METABOLIC PANEL: CPT

## 2025-05-16 PROCEDURE — 74177 CT ABD & PELVIS W/CONTRAST: CPT

## 2025-05-16 PROCEDURE — 96374 THER/PROPH/DIAG INJ IV PUSH: CPT

## 2025-05-16 PROCEDURE — 83690 ASSAY OF LIPASE: CPT

## 2025-05-16 PROCEDURE — 87086 URINE CULTURE/COLONY COUNT: CPT

## 2025-05-16 PROCEDURE — 82550 ASSAY OF CK (CPK): CPT

## 2025-05-16 RX ORDER — ACETAMINOPHEN 325 MG/1
650 TABLET ORAL ONCE
Status: COMPLETED | OUTPATIENT
Start: 2025-05-16 | End: 2025-05-16

## 2025-05-16 RX ORDER — IOPAMIDOL 755 MG/ML
75 INJECTION, SOLUTION INTRAVASCULAR
Status: COMPLETED | OUTPATIENT
Start: 2025-05-16 | End: 2025-05-16

## 2025-05-16 RX ORDER — ONDANSETRON 4 MG/1
4 TABLET, ORALLY DISINTEGRATING ORAL 3 TIMES DAILY PRN
Qty: 21 TABLET | Refills: 0 | Status: SHIPPED | OUTPATIENT
Start: 2025-05-16

## 2025-05-16 RX ORDER — KETOROLAC TROMETHAMINE 30 MG/ML
30 INJECTION, SOLUTION INTRAMUSCULAR; INTRAVENOUS ONCE
Status: COMPLETED | OUTPATIENT
Start: 2025-05-16 | End: 2025-05-16

## 2025-05-16 RX ORDER — 0.9 % SODIUM CHLORIDE 0.9 %
1000 INTRAVENOUS SOLUTION INTRAVENOUS ONCE
Status: COMPLETED | OUTPATIENT
Start: 2025-05-16 | End: 2025-05-16

## 2025-05-16 RX ADMIN — SODIUM CHLORIDE 1000 ML: 0.9 INJECTION, SOLUTION INTRAVENOUS at 18:30

## 2025-05-16 RX ADMIN — KETOROLAC TROMETHAMINE 30 MG: 30 INJECTION, SOLUTION INTRAMUSCULAR at 18:29

## 2025-05-16 RX ADMIN — ACETAMINOPHEN 650 MG: 325 TABLET ORAL at 18:29

## 2025-05-16 RX ADMIN — IOPAMIDOL 75 ML: 755 INJECTION, SOLUTION INTRAVENOUS at 19:39

## 2025-05-16 ASSESSMENT — LIFESTYLE VARIABLES
HOW OFTEN DO YOU HAVE A DRINK CONTAINING ALCOHOL: NEVER
HOW MANY STANDARD DRINKS CONTAINING ALCOHOL DO YOU HAVE ON A TYPICAL DAY: PATIENT DOES NOT DRINK

## 2025-05-16 ASSESSMENT — PAIN SCALES - GENERAL: PAINLEVEL_OUTOF10: 4

## 2025-05-16 ASSESSMENT — PAIN - FUNCTIONAL ASSESSMENT: PAIN_FUNCTIONAL_ASSESSMENT: 0-10

## 2025-05-16 ASSESSMENT — PAIN DESCRIPTION - LOCATION: LOCATION: ABDOMEN

## 2025-05-16 NOTE — ED TRIAGE NOTES
FIRST PROVIDER CONTACT ASSESSMENT NOTE       Department of Emergency Medicine                 First Provider Note            25  5:46 PM EDT    Date of Encounter: No admission date for patient encounter.    Patient Name: Brett Jiang  : 1975  MRN: [id number]    Chief Complaint: Abdominal Pain (fever, chills, LLQ, RLQ onset 4 days ago H/O diverticulitis) and Fever      History of Present Illness:   Brett Jiang is a 49 y.o. male who presents to the ED for lower abdominal pain and fever x 4 days.    Sent over from .   Hx diverticulitis     Focused Physical Exam:  VS:    ED Triage Vitals [25 1644]   BP Systolic BP Percentile Diastolic BP Percentile Temp Temp src Pulse Resp SpO2   -- -- -- -- -- -- -- --      Height Weight - Scale         1.753 m (5' 9\") 99.8 kg (220 lb)              Physical Ex: Constitutional: Alert and non-toxic.    Medical History:  has a past medical history of Colon polyp and Diverticulosis.  Surgical History:  has a past surgical history that includes Colonoscopy ().  Social History:  reports that he has never smoked. He has never used smokeless tobacco. He reports that he does not currently use alcohol. He reports that he does not use drugs.  Family History: family history includes Heart Failure in his father; Other in his brother.    Allergies: Patient has no known allergies.     Initial Plan of Care: Initiate Treatment-Testing, Proceed toTreatment Area When Bed Available for ED Attending/MLP to Continue Care      ---END OF FIRST PROVIDER CONTACT ASSESSMENT NOTE---  Electronically signed by Maxine Ventura PA-C   DD: 25

## 2025-05-16 NOTE — ED PROVIDER NOTES
Independent [facility] Visit.     [facility]  Department of Emergency Medicine   ED  Encounter Note  Admit Date/RoomTime: 2025  5:59 PM  ED Room: WAITING RESULTS/WAITING *  NAME: Brett Jiang  : 1975  MRN: [id number]     Chief Complaint:  Abdominal Pain (fever, chills, LLQ, RLQ onset 4 days ago H/O diverticulitis) and Fever    HISTORY OF PRESENT ILLNESS        Brett Jiang is a 49 y.o. male with a PMH significant for diverticulitis history presents to the ED with a complaint of abdominal pain with fever throughout the past 4 days.  Patient states he has had left lower quadrant abdominal pain for the past 4 days.  He is also been running a fever.  Feeling achy.  No appetite.  Denies nausea or vomiting.    Last bowel movement 2 to 3 days ago.  This is abnormal for him as patient states he normally goes once a day.  Denies any recent bloody stools.  Does admit his urine is dark in color.  Was at a clinic today and after palpation to the left lower quadrant he started with pain to the right lower quadrant also.  Symptoms are moderate in severity.  Has been taking Tylenol for the fever.      ROS   Pertinent positives and negatives are stated within HPI, all other systems reviewed and are negative.    Past Medical History:  has a past medical history of Colon polyp and Diverticulosis.    Surgical History:  has a past surgical history that includes Colonoscopy ().    Social History:  reports that he has never smoked. He has never used smokeless tobacco. He reports that he does not currently use alcohol. He reports that he does not use drugs.    Family History: family history includes Heart Failure in his father; Other in his brother.     Allergies: Patient has no known allergies.    PHYSICAL EXAM   Oxygen Saturation Interpretation: Normal on room air analysis.        ED Triage Vitals   BP Systolic BP Percentile Diastolic BP Percentile Temp Temp src Pulse Respirations SpO2   25 1745

## 2025-05-16 NOTE — PROGRESS NOTES
25  Brett Jiang : 1975 Sex: male  Age 49 y.o.      Subjective:  Chief Complaint   Patient presents with    Fever     X3 days    Chills    Sweats         HPI:     History of Present Illness  The patient is a 49-year-old male who presents for evaluation of fever and chills.    He has been experiencing persistent fever and chills for the past 4 days, with temperatures ranging from 101 to 102 degrees Fahrenheit. 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 Additionally, he reports feeling fatigued, with his work performance being significantly affected; he was only able to work for 3 hours yesterday and less than that today. He also reports excessive sweating and clamminess.    He denies any associated symptoms such as cough, congestion, runny nose, sore throat, or difficulty breathing. He also reports no new wounds or injuries. He has noticed residual urine in his boxers after urination for the past two mornings.     He has a past medical history of diverticulosis and diverticulitis.            ROS:   Unless otherwise stated in this report the patient's positive and negative responses for review of systems for constitutional, eyes, ENT, cardiovascular, respiratory, gastrointestinal, neurological, , musculoskeletal, and integument systems and related systems to the presenting problem are either stated in the history of present illness or were not pertinent or were negative for the symptoms and/or complaints related to the presenting medical problem.  Positives and pertinent negatives as per HPI.  All others reviewed and are negative.      PMH:     Past Medical History:   Diagnosis Date    Colon polyp     Diverticulosis        Past Surgical History:   Procedure Laterality Date    [place]      [facility]       Family History   Problem Relation Age of

## 2025-05-17 LAB
MICROORGANISM SPEC CULT: ABNORMAL
SERVICE CMNT-IMP: ABNORMAL
SPECIMEN DESCRIPTION: ABNORMAL

## 2025-05-17 NOTE — ED NOTES
PROGRESS NOTE    Was asked to follow the CT scan for prior provider pending discharge diagnosis of diverticulitis.  As official report came back there was noted to be a liver lesion and additionally constipation which the patient was made aware of.  He was also made aware that although there was no definitive radiologic evidence of diverticulitis plan will be to treat for this given his fever and symptoms and history.  He is advised to have close outpatient follow-up with his family medical doctor and is given strict return precautions should he worsen in the interim.     Nidia Etienne, CHRISTOPHER - CNP  05/17/25 0011

## 2025-05-19 ENCOUNTER — OFFICE VISIT (OUTPATIENT)
Dept: PRIMARY CARE CLINIC | Age: 50
End: 2025-05-19
Payer: COMMERCIAL

## 2025-05-19 VITALS
SYSTOLIC BLOOD PRESSURE: 130 MMHG | WEIGHT: 219 LBS | TEMPERATURE: 97.3 F | OXYGEN SATURATION: 99 % | BODY MASS INDEX: 32.34 KG/M2 | DIASTOLIC BLOOD PRESSURE: 82 MMHG | HEART RATE: 63 BPM

## 2025-05-19 DIAGNOSIS — B34.9 VIRAL SYNDROME: ICD-10-CM

## 2025-05-19 DIAGNOSIS — S30.860A TICK BITE OF LOWER BACK, INITIAL ENCOUNTER: ICD-10-CM
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
## 2025-05-19 DIAGNOSIS — B34.9 VIRAL SYNDROME: Primary | ICD-10-CM

## 2025-05-19 PROCEDURE — 99213 OFFICE O/P EST LOW 20 MIN: CPT | Performed by: FAMILY MEDICINE

## 2025-05-19 SDOH — ECONOMIC STABILITY: FOOD INSECURITY: WITHIN THE PAST 12 MONTHS, YOU WORRIED THAT YOUR FOOD WOULD RUN OUT BEFORE YOU GOT MONEY TO BUY MORE.: NEVER TRUE

## 2025-05-19 SDOH — ECONOMIC STABILITY: FOOD INSECURITY: WITHIN THE PAST 12 MONTHS, THE FOOD YOU BOUGHT JUST DIDN'T LAST AND YOU DIDN'T HAVE MONEY TO GET MORE.: NEVER TRUE

## 2025-05-19 ASSESSMENT — PATIENT HEALTH QUESTIONNAIRE - PHQ9
1. LITTLE INTEREST OR PLEASURE IN DOING THINGS: NOT AT ALL
SUM OF ALL RESPONSES TO PHQ QUESTIONS 1-9: 0
2. FEELING DOWN, DEPRESSED OR HOPELESS: NOT AT ALL
SUM OF ALL RESPONSES TO PHQ QUESTIONS 1-9: 0

## 2025-05-19 ASSESSMENT — ENCOUNTER SYMPTOMS
SWOLLEN GLANDS: 0
SHORTNESS OF BREATH: 1
NAUSEA: 0
VOMITING: 0

## 2025-05-19 NOTE — PROGRESS NOTES
Brett Jiang, a male of 49 y.o. came to the office 5/19/2025.     There is no problem list on file for this patient.         Fatigue  This is a new problem. The current episode started in the past 7 days (5/13). The problem has been gradually improving. Associated symptoms include arthralgias, chills, diaphoresis, fatigue, a fever (102.5 3 days ago.), a rash (left flank) and vertigo. Pertinent negatives include no joint swelling, myalgias, nausea, swollen glands or vomiting. Exacerbated by: found tick on him. He has tried sleep (slept in bed for 20 hrs 5/15 until 5/16.) for the symptoms.        No Known Allergies    Current Outpatient Medications on File Prior to Visit   Medication Sig Dispense Refill    amoxicillin-clavulanate (AUGMENTIN) 875-125 MG per tablet Take 1 tablet by mouth 2 times daily for 10 days 20 tablet 0    ondansetron (ZOFRAN-ODT) 4 MG disintegrating tablet Take 1 tablet by mouth 3 times daily as needed for Nausea or Vomiting 21 tablet 0     No current facility-administered medications on file prior to visit.       Review of Systems   Constitutional:  Positive for chills, diaphoresis, fatigue and fever (102.5 3 days ago.).   Respiratory:  Positive for shortness of breath.    Gastrointestinal:  Negative for nausea and vomiting.   Musculoskeletal:  Positive for arthralgias. Negative for joint swelling and myalgias.   Skin:  Positive for rash (left flank).   Neurological:  Positive for vertigo.     other review of systems reviewed and are negative    OBJECTIVE:  /82   Pulse 63   Temp 97.3 °F (36.3 °C)   Wt 99.3 kg (219 lb)   SpO2 99%   BMI 32.34 kg/m²      Physical Exam  Vitals reviewed.   Eyes:      General: No scleral icterus.     Conjunctiva/sclera: Conjunctivae normal.   Neck:      Thyroid: No thyromegaly.   Cardiovascular:      Rate and Rhythm: Normal rate and regular rhythm.      Heart sounds: No murmur heard.  Pulmonary:      Effort: Pulmonary effort is normal.      Breath sounds:

## 2025-05-20 LAB
BASOPHILS ABSOLUTE: 0 K/UL (ref 0–0.2)
BASOPHILS RELATIVE PERCENT: 0 % (ref 0–2)
EOSINOPHILS ABSOLUTE: 0.06 K/UL (ref 0.05–0.5)
EOSINOPHILS RELATIVE PERCENT: 2 % (ref 0–6)
HCT VFR BLD CALC: 38 % (ref 37–54)
HEMOGLOBIN: 13.2 G/DL (ref 12.5–16.5)
LYMPHOCYTES ABSOLUTE: 1.42 K/UL (ref 1.5–4)
LYMPHOCYTES RELATIVE PERCENT: 44 % (ref 20–42)
MCH RBC QN AUTO: 29.9 PG (ref 26–35)
MCHC RBC AUTO-ENTMCNC: 34.7 G/DL (ref 32–34.5)
MCV RBC AUTO: 86.2 FL (ref 80–99.9)
MONOCYTES ABSOLUTE: 0.28 K/UL (ref 0.1–0.95)
MONOCYTES RELATIVE PERCENT: 9 % (ref 2–12)
NEUTROPHILS ABSOLUTE: 1.45 K/UL (ref 1.8–7.3)
NEUTROPHILS RELATIVE PERCENT: 45 % (ref 43–80)
NUCLEATED RED BLOOD CELLS: 1 PER 100 WBC
PDW BLD-RTO: 12.9 % (ref 11.5–15)
PLATELET # BLD: 96 K/UL (ref 130–450)
PLATELET CONFIRMATION: NORMAL
PMV BLD AUTO: 12.1 FL (ref 7–12)
RBC # BLD: 4.41 M/UL (ref 3.8–5.8)
RBC # BLD: ABNORMAL 10*6/UL
WBC # BLD: 3.2 K/UL (ref 4.5–11.5)

## 2025-05-22 ENCOUNTER — RESULTS FOLLOW-UP (OUTPATIENT)
Dept: PRIMARY CARE CLINIC | Age: 50
End: 2025-05-22

## 2025-05-22 DIAGNOSIS — D69.6 THROMBOCYTOPENIA: Primary | ICD-10-CM

## 2025-05-22 LAB — BORRELIA BURGDORFERI ABS TOTAL: 0.29 IV

## 2025-05-23 NOTE — TELEPHONE ENCOUNTER
Left message patient's labs show a platelet count of 96,000 which is up from 80,000 recently.  However I would like to recheck this in 2 weeks.  He has no Lyme antibodies as well.

## 2025-05-30 ENCOUNTER — TELEPHONE (OUTPATIENT)
Dept: PRIMARY CARE CLINIC | Age: 50
End: 2025-05-30

## 2025-05-30 DIAGNOSIS — Z12.5 SCREENING PSA (PROSTATE SPECIFIC ANTIGEN): ICD-10-CM

## 2025-05-30 DIAGNOSIS — S30.860A TICK BITE OF LOWER BACK, INITIAL ENCOUNTER: Primary | ICD-10-CM
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
## 2025-05-30 NOTE — TELEPHONE ENCOUNTER
Brett is going for bloodwork on Monday. He is requesting for additional bloodwork to be ordered.     His WC is asking for another test for lyme disease    AND    To have his PSA checked    Thank you
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
## 2025-06-02 DIAGNOSIS — Z12.5 SCREENING PSA (PROSTATE SPECIFIC ANTIGEN): ICD-10-CM

## 2025-06-02 DIAGNOSIS — S30.860A TICK BITE OF LOWER BACK, INITIAL ENCOUNTER: ICD-10-CM

## 2025-06-02 DIAGNOSIS — D69.6 THROMBOCYTOPENIA: ICD-10-CM

## 2025-06-02 LAB
BASOPHILS ABSOLUTE: 0.02 K/UL (ref 0–0.2)
BASOPHILS RELATIVE PERCENT: 0 % (ref 0–2)
EOSINOPHILS ABSOLUTE: 0.13 K/UL (ref 0.05–0.5)
EOSINOPHILS RELATIVE PERCENT: 2 % (ref 0–6)
HCT VFR BLD CALC: 42.4 % (ref 37–54)
HEMOGLOBIN: 14.4 G/DL (ref 12.5–16.5)
IMMATURE GRANULOCYTES %: 0 % (ref 0–5)
IMMATURE GRANULOCYTES ABSOLUTE: <0.03 K/UL (ref 0–0.58)
LYMPHOCYTES ABSOLUTE: 1.99 K/UL (ref 1.5–4)
LYMPHOCYTES RELATIVE PERCENT: 37 % (ref 20–42)
MCH RBC QN AUTO: 30.4 PG (ref 26–35)
MCHC RBC AUTO-ENTMCNC: 34 G/DL (ref 32–34.5)
MCV RBC AUTO: 89.5 FL (ref 80–99.9)
MONOCYTES ABSOLUTE: 0.61 K/UL (ref 0.1–0.95)
MONOCYTES RELATIVE PERCENT: 12 % (ref 2–12)
NEUTROPHILS ABSOLUTE: 2.56 K/UL (ref 1.8–7.3)
NEUTROPHILS RELATIVE PERCENT: 48 % (ref 43–80)
PDW BLD-RTO: 12.9 % (ref 11.5–15)
PLATELET # BLD: 259 K/UL (ref 130–450)
PMV BLD AUTO: 10.5 FL (ref 7–12)
PROSTATE SPECIFIC ANTIGEN: 0.84 NG/ML (ref 0–4)
RBC # BLD: 4.74 M/UL (ref 3.8–5.8)
WBC # BLD: 5.3 K/UL (ref 4.5–11.5)

## 2025-06-04 ENCOUNTER — RESULTS FOLLOW-UP (OUTPATIENT)
Dept: PRIMARY CARE CLINIC | Age: 50
End: 2025-06-04

## 2025-06-04 LAB — BORRELIA BURGDORFERI ABS TOTAL: 0.59 IV
